# Patient Record
Sex: FEMALE | Race: WHITE | NOT HISPANIC OR LATINO | Employment: FULL TIME | URBAN - METROPOLITAN AREA
[De-identification: names, ages, dates, MRNs, and addresses within clinical notes are randomized per-mention and may not be internally consistent; named-entity substitution may affect disease eponyms.]

---

## 2018-02-19 LAB
PAP RECOMMENDATION EXT: NORMAL
PAP SMEAR: NORMAL

## 2019-07-19 LAB — HCV AB SERPL QL IA: NEGATIVE

## 2021-03-11 ENCOUNTER — INITIAL PRENATAL (OUTPATIENT)
Dept: OBSTETRICS AND GYNECOLOGY | Facility: CLINIC | Age: 31
End: 2021-03-11
Payer: OTHER GOVERNMENT

## 2021-03-11 DIAGNOSIS — Z32.01 PREGNANCY CONFIRMED BY POSITIVE URINE TEST: Primary | ICD-10-CM

## 2021-03-11 PROCEDURE — 0502F PR SUBSEQUENT PRENATAL CARE: ICD-10-PCS | Mod: S$GLB,,, | Performed by: OBSTETRICS & GYNECOLOGY

## 2021-03-11 PROCEDURE — 0502F SUBSEQUENT PRENATAL CARE: CPT | Mod: S$GLB,,, | Performed by: OBSTETRICS & GYNECOLOGY

## 2021-03-12 ENCOUNTER — PROCEDURE VISIT (OUTPATIENT)
Dept: OBSTETRICS AND GYNECOLOGY | Facility: CLINIC | Age: 31
End: 2021-03-12
Payer: COMMERCIAL

## 2021-03-12 ENCOUNTER — PROCEDURE VISIT (OUTPATIENT)
Dept: OBSTETRICS AND GYNECOLOGY | Facility: CLINIC | Age: 31
End: 2021-03-12
Attending: OBSTETRICS & GYNECOLOGY
Payer: COMMERCIAL

## 2021-03-12 DIAGNOSIS — Z32.01 PREGNANCY CONFIRMED BY POSITIVE URINE TEST: ICD-10-CM

## 2021-03-12 PROCEDURE — 76801 PR US, OB <14WKS, TRANSABD, SINGLE GESTATION: ICD-10-PCS | Mod: S$GLB,,, | Performed by: OBSTETRICS & GYNECOLOGY

## 2021-03-12 PROCEDURE — 76801 OB US < 14 WKS SINGLE FETUS: CPT | Mod: S$GLB,,, | Performed by: OBSTETRICS & GYNECOLOGY

## 2021-03-18 ENCOUNTER — TELEPHONE (OUTPATIENT)
Dept: OBSTETRICS AND GYNECOLOGY | Facility: CLINIC | Age: 31
End: 2021-03-18

## 2021-03-18 ENCOUNTER — PROCEDURE VISIT (OUTPATIENT)
Dept: OBSTETRICS AND GYNECOLOGY | Facility: CLINIC | Age: 31
End: 2021-03-18
Payer: COMMERCIAL

## 2021-03-18 DIAGNOSIS — O46.90: ICD-10-CM

## 2021-03-18 DIAGNOSIS — O47.9: ICD-10-CM

## 2021-03-18 PROCEDURE — 76801 OB US < 14 WKS SINGLE FETUS: CPT | Mod: S$GLB,,, | Performed by: OBSTETRICS & GYNECOLOGY

## 2021-03-18 PROCEDURE — 76801 PR US, OB <14WKS, TRANSABD, SINGLE GESTATION: ICD-10-PCS | Mod: S$GLB,,, | Performed by: OBSTETRICS & GYNECOLOGY

## 2021-04-27 ENCOUNTER — ROUTINE PRENATAL (OUTPATIENT)
Dept: OBSTETRICS AND GYNECOLOGY | Facility: CLINIC | Age: 31
End: 2021-04-27
Payer: OTHER GOVERNMENT

## 2021-04-27 ENCOUNTER — LAB VISIT (OUTPATIENT)
Dept: LAB | Facility: CLINIC | Age: 31
End: 2021-04-27
Payer: OTHER GOVERNMENT

## 2021-04-27 VITALS — WEIGHT: 224 LBS | DIASTOLIC BLOOD PRESSURE: 62 MMHG | SYSTOLIC BLOOD PRESSURE: 117 MMHG

## 2021-04-27 DIAGNOSIS — Z3A.15 15 WEEKS GESTATION OF PREGNANCY: Primary | ICD-10-CM

## 2021-04-27 DIAGNOSIS — Z3A.15 15 WEEKS GESTATION OF PREGNANCY: ICD-10-CM

## 2021-04-27 LAB
ABO + RH BLD: NORMAL
ABO + RH BLD: NORMAL
AMPHET+METHAMPHET UR QL: NEGATIVE
BARBITURATES UR QL SCN>200 NG/ML: NEGATIVE
BASOPHILS # BLD AUTO: 0.02 K/UL (ref 0–0.2)
BASOPHILS NFR BLD: 0.3 % (ref 0–1.9)
BENZODIAZ UR QL SCN>200 NG/ML: NEGATIVE
BLD GP AB SCN CELLS X3 SERPL QL: NORMAL
BZE UR QL SCN: NEGATIVE
CANNABINOIDS UR QL SCN: NEGATIVE
CREAT UR-MCNC: 121.6 MG/DL (ref 15–325)
DIFFERENTIAL METHOD: ABNORMAL
EOSINOPHIL # BLD AUTO: 0.1 K/UL (ref 0–0.5)
EOSINOPHIL NFR BLD: 0.7 % (ref 0–8)
ERYTHROCYTE [DISTWIDTH] IN BLOOD BY AUTOMATED COUNT: 12.4 % (ref 11.5–14.5)
HCT VFR BLD AUTO: 37.1 % (ref 37–48.5)
HGB BLD-MCNC: 12.3 G/DL (ref 12–16)
IMM GRANULOCYTES # BLD AUTO: 0.01 K/UL (ref 0–0.04)
IMM GRANULOCYTES NFR BLD AUTO: 0.1 % (ref 0–0.5)
LYMPHOCYTES # BLD AUTO: 1.2 K/UL (ref 1–4.8)
LYMPHOCYTES NFR BLD: 17.4 % (ref 18–48)
MCH RBC QN AUTO: 28.9 PG (ref 27–31)
MCHC RBC AUTO-ENTMCNC: 33.2 G/DL (ref 32–36)
MCV RBC AUTO: 87 FL (ref 82–98)
MONOCYTES # BLD AUTO: 0.5 K/UL (ref 0.3–1)
MONOCYTES NFR BLD: 6.9 % (ref 4–15)
NEUTROPHILS # BLD AUTO: 5.3 K/UL (ref 1.8–7.7)
NEUTROPHILS NFR BLD: 74.6 % (ref 38–73)
NRBC BLD-RTO: 0 /100 WBC
OPIATES UR QL SCN: NEGATIVE
PCP UR QL SCN>25 NG/ML: NEGATIVE
PLATELET # BLD AUTO: 148 K/UL (ref 150–450)
PMV BLD AUTO: 12.3 FL (ref 9.2–12.9)
RBC # BLD AUTO: 4.26 M/UL (ref 4–5.4)
TOXICOLOGY INFORMATION: NORMAL
WBC # BLD AUTO: 7.08 K/UL (ref 3.9–12.7)

## 2021-04-27 PROCEDURE — 80307 DRUG TEST PRSMV CHEM ANLYZR: CPT | Performed by: OBSTETRICS & GYNECOLOGY

## 2021-04-27 PROCEDURE — 86762 RUBELLA ANTIBODY: CPT | Performed by: OBSTETRICS & GYNECOLOGY

## 2021-04-27 PROCEDURE — 86592 SYPHILIS TEST NON-TREP QUAL: CPT | Performed by: OBSTETRICS & GYNECOLOGY

## 2021-04-27 PROCEDURE — 86850 RBC ANTIBODY SCREEN: CPT | Performed by: OBSTETRICS & GYNECOLOGY

## 2021-04-27 PROCEDURE — 86703 HIV-1/HIV-2 1 RESULT ANTBDY: CPT | Performed by: OBSTETRICS & GYNECOLOGY

## 2021-04-27 PROCEDURE — 87340 HEPATITIS B SURFACE AG IA: CPT | Performed by: OBSTETRICS & GYNECOLOGY

## 2021-04-27 PROCEDURE — 87591 N.GONORRHOEAE DNA AMP PROB: CPT | Performed by: OBSTETRICS & GYNECOLOGY

## 2021-04-27 PROCEDURE — 85025 COMPLETE CBC W/AUTO DIFF WBC: CPT | Performed by: OBSTETRICS & GYNECOLOGY

## 2021-04-27 PROCEDURE — 87086 URINE CULTURE/COLONY COUNT: CPT | Performed by: OBSTETRICS & GYNECOLOGY

## 2021-04-27 PROCEDURE — 0502F SUBSEQUENT PRENATAL CARE: CPT | Mod: S$GLB,,, | Performed by: OBSTETRICS & GYNECOLOGY

## 2021-04-27 PROCEDURE — 87491 CHLMYD TRACH DNA AMP PROBE: CPT | Performed by: OBSTETRICS & GYNECOLOGY

## 2021-04-27 PROCEDURE — 0502F PR SUBSEQUENT PRENATAL CARE: ICD-10-PCS | Mod: S$GLB,,, | Performed by: OBSTETRICS & GYNECOLOGY

## 2021-04-28 LAB
C TRACH DNA SPEC QL NAA+PROBE: NOT DETECTED
HBV SURFACE AG SERPL QL IA: NEGATIVE
HIV 1+2 AB+HIV1 P24 AG SERPL QL IA: NEGATIVE
N GONORRHOEA DNA SPEC QL NAA+PROBE: NOT DETECTED
RPR SER QL: NORMAL
RUBV IGG SER-ACNC: 24.4 IU/ML
RUBV IGG SER-IMP: REACTIVE

## 2021-04-29 LAB — BACTERIA UR CULT: NO GROWTH

## 2021-05-19 ENCOUNTER — LAB VISIT (OUTPATIENT)
Dept: LAB | Facility: CLINIC | Age: 31
End: 2021-05-19
Payer: COMMERCIAL

## 2021-05-19 ENCOUNTER — PROCEDURE VISIT (OUTPATIENT)
Dept: OBSTETRICS AND GYNECOLOGY | Facility: CLINIC | Age: 31
End: 2021-05-19
Payer: OTHER GOVERNMENT

## 2021-05-19 VITALS — SYSTOLIC BLOOD PRESSURE: 118 MMHG | DIASTOLIC BLOOD PRESSURE: 74 MMHG | WEIGHT: 210 LBS

## 2021-05-19 DIAGNOSIS — Z3A.15 15 WEEKS GESTATION OF PREGNANCY: ICD-10-CM

## 2021-05-19 DIAGNOSIS — Z86.69 HX OF MIGRAINE HEADACHES: ICD-10-CM

## 2021-05-19 DIAGNOSIS — Z34.92 NORMAL PREGNANCY IN SECOND TRIMESTER: Primary | ICD-10-CM

## 2021-05-19 DIAGNOSIS — Z3A.18 18 WEEKS GESTATION OF PREGNANCY: ICD-10-CM

## 2021-05-19 DIAGNOSIS — Z36.0 ENCOUNTER FOR ANTENATAL SCREENING FOR CHROMOSOMAL ANOMALIES: ICD-10-CM

## 2021-05-19 DIAGNOSIS — Z34.92 NORMAL PREGNANCY IN SECOND TRIMESTER: ICD-10-CM

## 2021-05-19 PROCEDURE — 36415 PR COLLECTION VENOUS BLOOD,VENIPUNCTURE: ICD-10-PCS | Mod: ,,, | Performed by: OBSTETRICS & GYNECOLOGY

## 2021-05-19 PROCEDURE — 0502F SUBSEQUENT PRENATAL CARE: CPT | Mod: S$GLB,,, | Performed by: OBSTETRICS & GYNECOLOGY

## 2021-05-19 PROCEDURE — 36415 COLL VENOUS BLD VENIPUNCTURE: CPT | Mod: ,,, | Performed by: OBSTETRICS & GYNECOLOGY

## 2021-05-19 PROCEDURE — 76805 OB US >/= 14 WKS SNGL FETUS: CPT | Mod: S$GLB,,, | Performed by: OBSTETRICS & GYNECOLOGY

## 2021-05-19 PROCEDURE — 82105 ALPHA-FETOPROTEIN SERUM: CPT | Performed by: OBSTETRICS & GYNECOLOGY

## 2021-05-19 PROCEDURE — 0502F PR SUBSEQUENT PRENATAL CARE: ICD-10-PCS | Mod: S$GLB,,, | Performed by: OBSTETRICS & GYNECOLOGY

## 2021-05-19 PROCEDURE — 76805 PR US, OB 14+WKS, TRANSABD, SINGLE GESTATION: ICD-10-PCS | Mod: S$GLB,,, | Performed by: OBSTETRICS & GYNECOLOGY

## 2021-05-19 RX ORDER — SUMATRIPTAN SUCCINATE 100 MG/1
100 TABLET ORAL ONCE AS NEEDED
Qty: 30 TABLET | Refills: 2 | Status: SHIPPED | OUTPATIENT
Start: 2021-05-19 | End: 2021-05-19

## 2021-05-21 ENCOUNTER — TELEPHONE (OUTPATIENT)
Dept: OBSTETRICS AND GYNECOLOGY | Facility: CLINIC | Age: 31
End: 2021-05-21

## 2021-05-21 DIAGNOSIS — Z86.69 HISTORY OF MIGRAINE HEADACHES: Primary | ICD-10-CM

## 2021-05-21 RX ORDER — SUMATRIPTAN SUCCINATE 100 MG/1
100 TABLET ORAL ONCE AS NEEDED
Qty: 30 TABLET | Refills: 2 | Status: SHIPPED | OUTPATIENT
Start: 2021-05-21 | End: 2021-08-24

## 2021-05-24 ENCOUNTER — TELEPHONE (OUTPATIENT)
Dept: OBSTETRICS AND GYNECOLOGY | Facility: CLINIC | Age: 31
End: 2021-05-24

## 2021-05-24 LAB
# FETUSES US: NORMAL
AFP INTERPRETATION: NORMAL
AFP MOM SERPL: 1.17
AFP SERPL-MCNC: 53.3 NG/ML
AFP SERPL-MCNC: NEGATIVE NG/ML
AGE AT DELIVERY: 31
GA (DAYS): 5 D
GA (WEEKS): 18 WK
GESTATIONAL AGE METHOD: NORMAL
IDDM PATIENT QL: NORMAL
SMOKING STATUS FTND: NO

## 2021-06-16 ENCOUNTER — ROUTINE PRENATAL (OUTPATIENT)
Dept: OBSTETRICS AND GYNECOLOGY | Facility: CLINIC | Age: 31
End: 2021-06-16
Payer: OTHER GOVERNMENT

## 2021-06-16 VITALS — WEIGHT: 218 LBS | DIASTOLIC BLOOD PRESSURE: 62 MMHG | SYSTOLIC BLOOD PRESSURE: 100 MMHG

## 2021-06-16 DIAGNOSIS — Z34.92 NORMAL PREGNANCY IN SECOND TRIMESTER: Primary | ICD-10-CM

## 2021-06-16 PROCEDURE — 0502F SUBSEQUENT PRENATAL CARE: CPT | Mod: S$GLB,,, | Performed by: OBSTETRICS & GYNECOLOGY

## 2021-06-16 PROCEDURE — 0502F PR SUBSEQUENT PRENATAL CARE: ICD-10-PCS | Mod: S$GLB,,, | Performed by: OBSTETRICS & GYNECOLOGY

## 2021-07-02 ENCOUNTER — PATIENT MESSAGE (OUTPATIENT)
Dept: ADMINISTRATIVE | Facility: OTHER | Age: 31
End: 2021-07-02

## 2021-07-12 DIAGNOSIS — Z34.92 NORMAL PREGNANCY IN SECOND TRIMESTER: Primary | ICD-10-CM

## 2021-07-15 ENCOUNTER — LAB VISIT (OUTPATIENT)
Dept: LAB | Facility: CLINIC | Age: 31
End: 2021-07-15
Payer: OTHER GOVERNMENT

## 2021-07-15 ENCOUNTER — PROCEDURE VISIT (OUTPATIENT)
Dept: OBSTETRICS AND GYNECOLOGY | Facility: CLINIC | Age: 31
End: 2021-07-15
Payer: OTHER GOVERNMENT

## 2021-07-15 VITALS — DIASTOLIC BLOOD PRESSURE: 72 MMHG | SYSTOLIC BLOOD PRESSURE: 116 MMHG | WEIGHT: 226 LBS

## 2021-07-15 DIAGNOSIS — Z34.92 NORMAL PREGNANCY IN SECOND TRIMESTER: ICD-10-CM

## 2021-07-15 DIAGNOSIS — Z3A.27 PREGNANCY WITH 27 COMPLETED WEEKS GESTATION: Primary | ICD-10-CM

## 2021-07-15 LAB
BASOPHILS # BLD AUTO: 0.01 K/UL (ref 0–0.2)
BASOPHILS NFR BLD: 0.1 % (ref 0–1.9)
DIFFERENTIAL METHOD: ABNORMAL
EOSINOPHIL # BLD AUTO: 0.1 K/UL (ref 0–0.5)
EOSINOPHIL NFR BLD: 0.6 % (ref 0–8)
ERYTHROCYTE [DISTWIDTH] IN BLOOD BY AUTOMATED COUNT: 12 % (ref 11.5–14.5)
GLUCOSE SERPL-MCNC: 116 MG/DL (ref 70–140)
HCT VFR BLD AUTO: 36.6 % (ref 37–48.5)
HGB BLD-MCNC: 11.9 G/DL (ref 12–16)
IMM GRANULOCYTES # BLD AUTO: 0.03 K/UL (ref 0–0.04)
IMM GRANULOCYTES NFR BLD AUTO: 0.4 % (ref 0–0.5)
LYMPHOCYTES # BLD AUTO: 1.1 K/UL (ref 1–4.8)
LYMPHOCYTES NFR BLD: 12.5 % (ref 18–48)
MCH RBC QN AUTO: 28.3 PG (ref 27–31)
MCHC RBC AUTO-ENTMCNC: 32.5 G/DL (ref 32–36)
MCV RBC AUTO: 87 FL (ref 82–98)
MONOCYTES # BLD AUTO: 0.5 K/UL (ref 0.3–1)
MONOCYTES NFR BLD: 5.6 % (ref 4–15)
NEUTROPHILS # BLD AUTO: 6.9 K/UL (ref 1.8–7.7)
NEUTROPHILS NFR BLD: 80.8 % (ref 38–73)
NRBC BLD-RTO: 0 /100 WBC
PLATELET # BLD AUTO: 113 K/UL (ref 150–450)
PMV BLD AUTO: 12.7 FL (ref 9.2–12.9)
RBC # BLD AUTO: 4.21 M/UL (ref 4–5.4)
WBC # BLD AUTO: 8.55 K/UL (ref 3.9–12.7)

## 2021-07-15 PROCEDURE — 76815 PR  US,PREGNANT UTERUS,LIMITED, 1/> FETUSES: ICD-10-PCS | Mod: S$GLB,,, | Performed by: OBSTETRICS & GYNECOLOGY

## 2021-07-15 PROCEDURE — 76815 OB US LIMITED FETUS(S): CPT | Mod: S$GLB,,, | Performed by: OBSTETRICS & GYNECOLOGY

## 2021-07-15 PROCEDURE — 82950 GLUCOSE TEST: CPT | Performed by: NURSE PRACTITIONER

## 2021-07-15 PROCEDURE — 36415 COLL VENOUS BLD VENIPUNCTURE: CPT | Mod: ,,, | Performed by: NURSE PRACTITIONER

## 2021-07-15 PROCEDURE — 59425 PR ANTEPARTUM CARE ONLY, 4-6 VISITS: ICD-10-PCS | Mod: S$GLB,,, | Performed by: OBSTETRICS & GYNECOLOGY

## 2021-07-15 PROCEDURE — 36415 PR COLLECTION VENOUS BLOOD,VENIPUNCTURE: ICD-10-PCS | Mod: ,,, | Performed by: NURSE PRACTITIONER

## 2021-07-15 PROCEDURE — 85025 COMPLETE CBC W/AUTO DIFF WBC: CPT | Performed by: NURSE PRACTITIONER

## 2021-07-15 PROCEDURE — 59425 ANTEPARTUM CARE ONLY: CPT | Mod: S$GLB,,, | Performed by: OBSTETRICS & GYNECOLOGY

## 2021-07-23 ENCOUNTER — PATIENT MESSAGE (OUTPATIENT)
Dept: ADMINISTRATIVE | Facility: OTHER | Age: 31
End: 2021-07-23

## 2021-08-11 ENCOUNTER — TELEPHONE (OUTPATIENT)
Dept: OBSTETRICS AND GYNECOLOGY | Facility: CLINIC | Age: 31
End: 2021-08-11

## 2021-08-12 ENCOUNTER — ROUTINE PRENATAL (OUTPATIENT)
Dept: OBSTETRICS AND GYNECOLOGY | Facility: CLINIC | Age: 31
End: 2021-08-12
Payer: OTHER GOVERNMENT

## 2021-08-12 VITALS — WEIGHT: 225 LBS | DIASTOLIC BLOOD PRESSURE: 70 MMHG | SYSTOLIC BLOOD PRESSURE: 120 MMHG

## 2021-08-12 DIAGNOSIS — Z3A.31 PREGNANCY WITH 31 COMPLETED WEEKS GESTATION: Primary | ICD-10-CM

## 2021-08-12 PROCEDURE — 0502F PR SUBSEQUENT PRENATAL CARE: ICD-10-PCS | Mod: S$GLB,,, | Performed by: OBSTETRICS & GYNECOLOGY

## 2021-08-12 PROCEDURE — 0502F SUBSEQUENT PRENATAL CARE: CPT | Mod: S$GLB,,, | Performed by: OBSTETRICS & GYNECOLOGY

## 2021-08-27 ENCOUNTER — TELEPHONE (OUTPATIENT)
Dept: OBSTETRICS AND GYNECOLOGY | Facility: CLINIC | Age: 31
End: 2021-08-27

## 2021-08-27 DIAGNOSIS — Z34.93 NORMAL PREGNANCY IN THIRD TRIMESTER: Primary | ICD-10-CM

## 2021-09-09 ENCOUNTER — PROCEDURE VISIT (OUTPATIENT)
Dept: OBSTETRICS AND GYNECOLOGY | Facility: CLINIC | Age: 31
End: 2021-09-09
Payer: COMMERCIAL

## 2021-09-09 ENCOUNTER — ROUTINE PRENATAL (OUTPATIENT)
Dept: OBSTETRICS AND GYNECOLOGY | Facility: CLINIC | Age: 31
End: 2021-09-09
Payer: COMMERCIAL

## 2021-09-09 VITALS
DIASTOLIC BLOOD PRESSURE: 68 MMHG | HEIGHT: 68 IN | BODY MASS INDEX: 34.86 KG/M2 | SYSTOLIC BLOOD PRESSURE: 118 MMHG | WEIGHT: 230 LBS

## 2021-09-09 DIAGNOSIS — Z3A.34 34 WEEKS GESTATION OF PREGNANCY: Primary | ICD-10-CM

## 2021-09-09 DIAGNOSIS — Z3A.35 PREGNANCY WITH 35 COMPLETED WEEKS GESTATION: ICD-10-CM

## 2021-09-09 DIAGNOSIS — Z34.93 NORMAL PREGNANCY IN THIRD TRIMESTER: ICD-10-CM

## 2021-09-09 PROCEDURE — 0502F SUBSEQUENT PRENATAL CARE: CPT | Mod: S$GLB,,, | Performed by: NURSE PRACTITIONER

## 2021-09-09 PROCEDURE — 87081 CULTURE SCREEN ONLY: CPT | Performed by: NURSE PRACTITIONER

## 2021-09-09 PROCEDURE — 76816 PR  US,PREGNANT UTERUS,F/U,TRANSABD APP: ICD-10-PCS | Mod: S$GLB,,, | Performed by: OBSTETRICS & GYNECOLOGY

## 2021-09-09 PROCEDURE — 76816 OB US FOLLOW-UP PER FETUS: CPT | Mod: S$GLB,,, | Performed by: OBSTETRICS & GYNECOLOGY

## 2021-09-09 PROCEDURE — 0502F PR SUBSEQUENT PRENATAL CARE: ICD-10-PCS | Mod: S$GLB,,, | Performed by: NURSE PRACTITIONER

## 2021-09-13 LAB — BACTERIA SPEC AEROBE CULT: NORMAL

## 2021-09-16 ENCOUNTER — ROUTINE PRENATAL (OUTPATIENT)
Dept: OBSTETRICS AND GYNECOLOGY | Facility: CLINIC | Age: 31
End: 2021-09-16
Payer: OTHER GOVERNMENT

## 2021-09-16 VITALS — SYSTOLIC BLOOD PRESSURE: 110 MMHG | DIASTOLIC BLOOD PRESSURE: 60 MMHG | WEIGHT: 230 LBS | BODY MASS INDEX: 34.97 KG/M2

## 2021-09-16 DIAGNOSIS — Z34.93 NORMAL PREGNANCY IN THIRD TRIMESTER: Primary | ICD-10-CM

## 2021-09-16 DIAGNOSIS — Z3A.36 36 WEEKS GESTATION OF PREGNANCY: ICD-10-CM

## 2021-09-16 PROCEDURE — 0502F PR SUBSEQUENT PRENATAL CARE: ICD-10-PCS | Mod: S$GLB,,, | Performed by: NURSE PRACTITIONER

## 2021-09-16 PROCEDURE — 0502F SUBSEQUENT PRENATAL CARE: CPT | Mod: S$GLB,,, | Performed by: NURSE PRACTITIONER

## 2021-09-22 ENCOUNTER — ROUTINE PRENATAL (OUTPATIENT)
Dept: OBSTETRICS AND GYNECOLOGY | Facility: CLINIC | Age: 31
End: 2021-09-22
Payer: OTHER GOVERNMENT

## 2021-09-22 VITALS — WEIGHT: 233 LBS | DIASTOLIC BLOOD PRESSURE: 70 MMHG | BODY MASS INDEX: 35.43 KG/M2 | SYSTOLIC BLOOD PRESSURE: 114 MMHG

## 2021-09-22 DIAGNOSIS — Z34.93 NORMAL PREGNANCY IN THIRD TRIMESTER: Primary | ICD-10-CM

## 2021-09-22 DIAGNOSIS — Z3A.37 37 WEEKS GESTATION OF PREGNANCY: ICD-10-CM

## 2021-09-22 PROCEDURE — 0502F PR SUBSEQUENT PRENATAL CARE: ICD-10-PCS | Mod: S$GLB,,, | Performed by: NURSE PRACTITIONER

## 2021-09-22 PROCEDURE — 0502F SUBSEQUENT PRENATAL CARE: CPT | Mod: S$GLB,,, | Performed by: NURSE PRACTITIONER

## 2021-09-29 ENCOUNTER — ROUTINE PRENATAL (OUTPATIENT)
Dept: OBSTETRICS AND GYNECOLOGY | Facility: CLINIC | Age: 31
End: 2021-09-29
Payer: OTHER GOVERNMENT

## 2021-09-29 VITALS — WEIGHT: 239 LBS | DIASTOLIC BLOOD PRESSURE: 74 MMHG | BODY MASS INDEX: 36.34 KG/M2 | SYSTOLIC BLOOD PRESSURE: 122 MMHG

## 2021-09-29 DIAGNOSIS — Z3A.38 38 WEEKS GESTATION OF PREGNANCY: ICD-10-CM

## 2021-09-29 DIAGNOSIS — Z34.93 NORMAL PREGNANCY IN THIRD TRIMESTER: Primary | ICD-10-CM

## 2021-09-29 PROCEDURE — 0502F SUBSEQUENT PRENATAL CARE: CPT | Mod: S$GLB,,, | Performed by: NURSE PRACTITIONER

## 2021-09-29 PROCEDURE — 0502F PR SUBSEQUENT PRENATAL CARE: ICD-10-PCS | Mod: S$GLB,,, | Performed by: NURSE PRACTITIONER

## 2021-09-30 ENCOUNTER — PATIENT MESSAGE (OUTPATIENT)
Dept: OBSTETRICS AND GYNECOLOGY | Facility: CLINIC | Age: 31
End: 2021-09-30

## 2021-10-05 ENCOUNTER — OUTSIDE PLACE OF SERVICE (OUTPATIENT)
Dept: OBSTETRICS AND GYNECOLOGY | Facility: CLINIC | Age: 31
End: 2021-10-05
Payer: COMMERCIAL

## 2021-10-05 PROCEDURE — 59400 OBSTETRICAL CARE: CPT | Mod: ,,, | Performed by: OBSTETRICS & GYNECOLOGY

## 2021-10-05 PROCEDURE — 59400 PR FULL ROUT OBSTE CARE,VAGINAL DELIV: ICD-10-PCS | Mod: ,,, | Performed by: OBSTETRICS & GYNECOLOGY

## 2021-10-21 ENCOUNTER — PATIENT MESSAGE (OUTPATIENT)
Dept: OBSTETRICS AND GYNECOLOGY | Facility: CLINIC | Age: 31
End: 2021-10-21
Payer: COMMERCIAL

## 2021-10-21 ENCOUNTER — TELEPHONE (OUTPATIENT)
Dept: OBSTETRICS AND GYNECOLOGY | Facility: CLINIC | Age: 31
End: 2021-10-21

## 2021-11-19 ENCOUNTER — TELEPHONE (OUTPATIENT)
Dept: OBSTETRICS AND GYNECOLOGY | Facility: CLINIC | Age: 31
End: 2021-11-19
Payer: OTHER GOVERNMENT

## 2021-11-19 VITALS
SYSTOLIC BLOOD PRESSURE: 152 MMHG | BODY MASS INDEX: 31.96 KG/M2 | WEIGHT: 215.81 LBS | HEIGHT: 69 IN | DIASTOLIC BLOOD PRESSURE: 92 MMHG

## 2021-11-19 DIAGNOSIS — Z30.430 ENCOUNTER FOR IUD INSERTION: Primary | ICD-10-CM

## 2021-11-19 DIAGNOSIS — R60.9 FLUID RETENTION: ICD-10-CM

## 2021-11-19 DIAGNOSIS — Z30.09 BIRTH CONTROL COUNSELING: ICD-10-CM

## 2021-11-19 PROCEDURE — 0503F POSTPARTUM CARE VISIT: CPT | Mod: S$GLB,,, | Performed by: OBSTETRICS & GYNECOLOGY

## 2021-11-19 PROCEDURE — 0503F PR POSTPARTUM CARE VISIT: ICD-10-PCS | Mod: S$GLB,,, | Performed by: OBSTETRICS & GYNECOLOGY

## 2021-11-19 RX ORDER — TRIAMTERENE AND HYDROCHLOROTHIAZIDE 37.5; 25 MG/1; MG/1
1 CAPSULE ORAL DAILY
Qty: 30 CAPSULE | Refills: 11 | Status: SHIPPED | OUTPATIENT
Start: 2021-11-19 | End: 2022-02-25 | Stop reason: SDUPTHER

## 2021-12-16 ENCOUNTER — PROCEDURE VISIT (OUTPATIENT)
Dept: OBSTETRICS AND GYNECOLOGY | Facility: CLINIC | Age: 31
End: 2021-12-16
Payer: OTHER GOVERNMENT

## 2021-12-16 VITALS
HEIGHT: 69 IN | DIASTOLIC BLOOD PRESSURE: 74 MMHG | BODY MASS INDEX: 31.55 KG/M2 | WEIGHT: 213 LBS | SYSTOLIC BLOOD PRESSURE: 128 MMHG

## 2021-12-16 DIAGNOSIS — Z97.5 CONTRACEPTIVE DEVICE, INTRAUTERINE: Primary | ICD-10-CM

## 2021-12-16 DIAGNOSIS — Z30.430 ENCOUNTER FOR INSERTION OF PARAGARD IUD: ICD-10-CM

## 2021-12-16 LAB
B-HCG UR QL: NEGATIVE
CTP QC/QA: YES

## 2021-12-16 PROCEDURE — 58300 PR INSERT INTRAUTERINE DEVICE: ICD-10-PCS | Mod: S$GLB,,, | Performed by: NURSE PRACTITIONER

## 2021-12-16 PROCEDURE — 81025 POCT URINE PREGNANCY: ICD-10-PCS | Mod: S$GLB,,, | Performed by: NURSE PRACTITIONER

## 2021-12-16 PROCEDURE — 81025 URINE PREGNANCY TEST: CPT | Mod: S$GLB,,, | Performed by: NURSE PRACTITIONER

## 2021-12-16 PROCEDURE — 58300 INSERT INTRAUTERINE DEVICE: CPT | Mod: S$GLB,,, | Performed by: NURSE PRACTITIONER

## 2022-01-19 ENCOUNTER — OFFICE VISIT (OUTPATIENT)
Dept: OBSTETRICS AND GYNECOLOGY | Facility: CLINIC | Age: 32
End: 2022-01-19
Payer: OTHER GOVERNMENT

## 2022-01-19 VITALS
SYSTOLIC BLOOD PRESSURE: 140 MMHG | WEIGHT: 211.81 LBS | DIASTOLIC BLOOD PRESSURE: 80 MMHG | HEIGHT: 69 IN | BODY MASS INDEX: 31.37 KG/M2

## 2022-01-19 DIAGNOSIS — Z30.431 IUD CHECK UP: Primary | ICD-10-CM

## 2022-01-19 DIAGNOSIS — G89.29 CHRONIC BILATERAL LOW BACK PAIN WITH RIGHT-SIDED SCIATICA: ICD-10-CM

## 2022-01-19 DIAGNOSIS — M54.41 CHRONIC BILATERAL LOW BACK PAIN WITH RIGHT-SIDED SCIATICA: ICD-10-CM

## 2022-01-19 PROBLEM — M25.559 ARTHRALGIA OF HIP: Status: ACTIVE | Noted: 2022-01-19

## 2022-01-19 PROCEDURE — 99212 OFFICE O/P EST SF 10 MIN: CPT | Mod: S$GLB,,, | Performed by: NURSE PRACTITIONER

## 2022-01-19 PROCEDURE — 99212 PR OFFICE/OUTPT VISIT, EST, LEVL II, 10-19 MIN: ICD-10-PCS | Mod: S$GLB,,, | Performed by: NURSE PRACTITIONER

## 2022-01-19 RX ORDER — IBUPROFEN 800 MG/1
800 TABLET ORAL EVERY 8 HOURS PRN
Qty: 60 TABLET | Refills: 2 | Status: SHIPPED | OUTPATIENT
Start: 2022-01-19 | End: 2022-02-25 | Stop reason: SDUPTHER

## 2022-01-19 NOTE — PROGRESS NOTES
"CC: IUD check     Lisa Buenrostro is a 31 y.o. female  presents for IUD check.  She denies any issues, problems or complaints.     BP (!) 140/80   Ht 5' 9" (1.753 m)   Wt 96.1 kg (211 lb 12.8 oz)   LMP 2021   Breastfeeding No   BMI 31.28 kg/m²       ROS:  GENERAL: No fever, chills, or fatigue   VULVAR: No pain, no lesions and no itching.  VAGINAL: No itching, no discharge, no abnormal bleeding and no lesions.  ABDOMEN: No abdominal pain. Denies nausea. Denies vomiting. No diarrhea. No constipation  URINARY: No incontinence, no nocturia, no frequency and no dysuria.  CARDIOVASCULAR: No chest pain. No shortness of breath.   NEUROLOGICAL: No headaches. No vision changes.    Physical Exam:      PELVIC: Normal external genitalia without lesions.  Vagina without lesions or discharge.  Cervix without lesions and IUD strings are visible at os.      Patient is still having low back pain that she was having during pregnancy and post-postpartum.  Ibuprofen refilled and will send referral to Dr. Broussard to evaluate back pain.      1. IUD check up      F/U in 1 year or as needed    "

## 2022-01-24 ENCOUNTER — TELEPHONE (OUTPATIENT)
Dept: OBSTETRICS AND GYNECOLOGY | Facility: CLINIC | Age: 32
End: 2022-01-24
Payer: OTHER GOVERNMENT

## 2022-01-24 NOTE — TELEPHONE ENCOUNTER
----- Message from Gavi Washburn sent at 1/24/2022 10:29 AM CST -----  Contact: pt  Type: Needs Medical Advice         Who Called: pt  Best Call Back Number:[phone]    Additional Information: Requesting a call back regarding  pt tested pos on 01/08 for covid and working from home. Pt needs letter from  that stating she can return to the office setting for work. Pt would like office to call her .  Pt would like to know if the letter can be put on her mychart.      Please Advise- Thank you

## 2022-01-25 ENCOUNTER — TELEPHONE (OUTPATIENT)
Dept: OBSTETRICS AND GYNECOLOGY | Facility: CLINIC | Age: 32
End: 2022-01-25
Payer: OTHER GOVERNMENT

## 2022-01-25 NOTE — TELEPHONE ENCOUNTER
----- Message from Angie Avel sent at 1/24/2022  4:48 PM CST -----  Contact: Patient  Type: Patient Call Back         Who called: Patient         What is the request in detail: calling regarding letter; states she returning call; states she Is looking mychart and still does not see letter; please advise         Can the clinic reply by [facility]? Call back         Would the patient rather a call back or a response via [facility]? Call back         Best call back number: [phone]         Additional Information:            Thank You

## 2022-02-25 ENCOUNTER — TELEPHONE (OUTPATIENT)
Dept: OBSTETRICS AND GYNECOLOGY | Facility: CLINIC | Age: 32
End: 2022-02-25
Payer: OTHER GOVERNMENT

## 2022-02-25 DIAGNOSIS — R60.9 FLUID RETENTION: ICD-10-CM

## 2022-02-25 RX ORDER — TRIAMTERENE AND HYDROCHLOROTHIAZIDE 37.5; 25 MG/1; MG/1
1 CAPSULE ORAL DAILY
Qty: 30 CAPSULE | Refills: 11 | Status: SHIPPED | OUTPATIENT
Start: 2022-02-25 | End: 2023-02-15 | Stop reason: SDUPTHER

## 2022-02-25 RX ORDER — IBUPROFEN 800 MG/1
800 TABLET ORAL EVERY 8 HOURS PRN
Qty: 60 TABLET | Refills: 2 | Status: SHIPPED | OUTPATIENT
Start: 2022-02-25 | End: 2023-02-25

## 2022-02-25 NOTE — TELEPHONE ENCOUNTER
----- Message from Anne Bailey LPN sent at 2/25/2022  1:56 PM CST -----  Contact: pt  Pt was seen on 1/19/22.      ----- Message -----  From: Cecilia Napier  Sent: 2/25/2022  12:31 PM CST  To: Nu Stein I Staff    Type:  RX Refill Request-- resend to new pharm per insurance    Who Called:  the patient  Refill or New Rx:  refill  RX Name and Strength:     1. triamterene-hydrochlorothiazide 37.5-25 mg (DYAZIDE) 37.5-25 mg per capsule 30 capsule   Sig - Route: Take 1 capsule by mouth once daily. - Oral    2. ibuprofen (ADVIL,MOTRIN) 800 MG tablet 60 tablet  Sig - Route: Take 1 tablet (800 mg total) by mouth every 8 (eight) hours as needed for Pain. - Oral          How is the patient currently taking it? (ex. 1XDay):  as order  Is this a 30 day or 90 day RX:  as order  Preferred Pharmacy with phone number:        [facility]/pharmacy #[id number] - [place], [place] - [address] [facility]  [address]  [place] [address]  Phone: [phone] Fax: [phone]      Local or Mail Order:  local  Ordering Provider:  Nu Head Call Back Number:  [phone]  Additional Information:

## 2022-06-14 ENCOUNTER — TELEPHONE (OUTPATIENT)
Dept: OBSTETRICS AND GYNECOLOGY | Facility: CLINIC | Age: 32
End: 2022-06-14
Payer: OTHER GOVERNMENT

## 2022-06-14 RX ORDER — PROGESTERONE 200 MG/1
200 CAPSULE ORAL NIGHTLY
Qty: 24 CAPSULE | Refills: 1 | Status: SHIPPED | OUTPATIENT
Start: 2022-06-14 | End: 2023-02-15

## 2022-06-14 NOTE — TELEPHONE ENCOUNTER
Steroid injections can cause menstrual abnormalities but should resolve within a month.  IF not resolving I can call in some progesterone to stop irregular bleeding and reset cycle.  It will be at the pharmacy if she wants to pick it up.

## 2022-06-14 NOTE — TELEPHONE ENCOUNTER
Pt stated her menses has been on since 05/29/2022.  She stated that the only difference is that she received a steroid injection to the hip. Pt is inquiring if this is cause for concern.

## 2023-02-14 ENCOUNTER — PATIENT OUTREACH (OUTPATIENT)
Dept: ADMINISTRATIVE | Facility: HOSPITAL | Age: 33
End: 2023-02-14
Payer: OTHER GOVERNMENT

## 2023-02-14 NOTE — LETTER
"                      FAX      AUTHORIZATION FOR RELEASE OF   CONFIDENTIAL INFORMATION        Health Information      We are seeing Lisa Buenrostro, date of birth 1990, in the clinic at [facility]. Primary Doctor No is the patient's PCP. Lisa Buenrostro has an outstanding lab/procedure at the time we reviewed their chart. In order to help keep their health information updated, Lisa Buenrostro has authorized us to request the following medical record(s):         ( X )  PAP SMEAR (WITHIN 3 YRS OR HPV WITHIN 5 YRS)                  Please fax records to [facility]  [phone]        MARLA WHITESIDE LPN  CLINICAL CARE COORDINATOR   [facility]  PHONE: [phone]  FAX: [phone]           A. Consent for Examination and Treatment: I hereby authorize the providers and employees of [facility] to provide medical treatment/services which includes, but is not limited to, performing and administering tests and diagnostic procedures that are deemed necessary, Including, but not limited to, imaging examinations, blood tests and other laboratory procedures as may be required by the hospital, clinic, or may be ordered by my physician(s) or persons working under the general and/or special instructions of my physician(s).                   1.      I understand and agree that this consent covers all authorized persons, including but not limited to physicians, residents, nurse practitioners, physicians' assistants, specialists, consultants, student nurses, and independently contracted physicians, who are called upon by the physician in charge, to carry out the diagnostic procedures and medical or surgical treatment.  2.      I hereby authorize [facility] to retain or dispose of any specimens or tissue, should there be such remaining from any test or procedure.  3.      I hereby authorize and give consent for [facility] providers and employees to take photographs, images or videotapes of " such diagnostic, surgical or treatment procedures of Patient as may be required by [facility] or as may be ordered by a physician.  I further acknowledge and agree that [facility] may use cameras or other devices for patient monitoring.  4.      I am aware that the practice of medicine is not an exact science, and I acknowledge that no guarantees have been made to me as to the outcome of any tests, procedures or treatment.                   B. Authorization for Release of Information:  I understand that my insurance company and/or their agents may need information necessary to make determinations about payment/reimbursement.  I hereby provide authorization to release to all insurance companies, their successors, assignees, other parties with whom they may have contracted, or others acting on their behalf, that are involved with payment for any hospital and/or clinic charges incurred by the patient, any information that they request and deem necessary for payment/reimbursement, and/or quality review.  I further authorize the release of my health information to physicians or other health care practitioners on staff who are involved in my health care now and in the future, and to other health care providers, entities, or institutions for the purpose of my continued care and treatment, including referrals.     C. Medicare Patient's Certification and Authorization to Release Information and Payment Request:  I certify that the information given by me in applying for payment under Title XVIII of the Social Security Act is correct.  I authorize any chowdary of medical or other information about me to release to the Social Security Administration, or its intermediaries or carriers, any information needed for this or a related Medicare claim.  I request that payment of authorized benefits be made on my behalf.     D. Assignment of Insurance Benefits:  I hereby authorize any and all insurance companies, health plans, defined benefit  plans, health insurers or any entity that is or may be responsible for payment of my medical expenses to pay all hospital and medical benefits now due, and to become due and payable to me under any hospital benefits, sick benefits, injury benefits or any other benefit for services rendered to me, including Major Medical Benefits, direct to [facility] and all independently contracted physicians.  I assign any and all rights that I may have against any and all insurance companies, health plans, defined benefit plans, health insurers or any entity that is or may be responsible for payment of my medical expenses, including, but not limited to any right to appeal a denial of a claim, any right to bring any action, lawsuit, administrative proceeding, or other cause of action on my behalf.  I specifically assign my right to pursue litigation against any and all insurance companies, health plans, defined benefit plans, health insurers or any entity that is or may be responsible for payment of medical expenses based upon a refusal to pay charges.              REGISTRATION  AUTHORIZATION                    E. Valuables:  It is understood and agreed that [facility] is not liable for the damage to or loss of any money, jewelry, documents, dentures, eye glasses, hearing aids, prosthetics, or other property of value.     F. Computer Equipment:  I understand and agree that should I choose to use computer equipment owned by [facility] or if I choose to access the Internet via [employer]'s network, I do so at my own risk.  [facility] is not responsible for any damage to my computer equipment or to any damages of any type that might arise from my loss of equipment or data.                   G. Acceptance of Financial Responsibility:  I agree that in consideration of the services and supplies that have been or will be furnished to the patient,  I am hereby obligated to pay all charges made for or on the account of the patient according to the  standard rates (in effect at the time the services and supplies are delivered) established by [facility], including its Patient Financial Assistance Policy to the extent it is applicable.  I understand that I am responsible for all charges, or portions thereof, not covered by insurance or other sources.  Patient refunds will be distributed only after balances at all [facility] facilities are paid.                   H. Communication Authorization:  I hereby authorize [facility] and its representatives, along with any billing service or  who may work on their behalf, to contact me on my cell phone and/or home phone using prerecorded messages, artificial voice messages, automatic telephone dialing devices or other computer assisted technology, or by electronic mail, text messaging, or by any other form of electronic communication.  This includes, but is not limited to appointment reminders, yearly physical exam reminders, preventive care reminders, patient campaigns, welcome calls, and calls about account balances on my account or any account on which I am listed as a guarantor.  I understand I have the right to opt out of these communications at any time.     I.  Relationship Between Facility and Physician:  I understand that some, but not all, providers furnishing services to the patient are not employees or agents of [facility].  The patient is under the care and supervision of his/her attending physician, and it is the responsibility of the facility and its nursing staff to carry out the instructions of such physicians.  It is the responsibility of the patient's physician/designee to obtain the patient's informed consent, when required, for medical or surgical treatment, special diagnostic or therapeutic procedures, or hospital services rendered for the patient under the special instructions of the physician/designee.     J. Notice of Private Practices:  I acknowledge I have received a copy of [facility]'s Notice  of Privacy Practices.     K. Facility Directory:  I have discussed with the organization my desire to be either included or excluded in the facility directory.  I understand that if my choice is to opt-out of being identified in the facility directory that the facility will not provide any information about me such as my condition (e.g. Fair, stable, etc.) or my location in the facility (e.g. Room number, department).     L. LINKS:  For [place] Residents:  [facility] is a LINKS ([place] Immunization Network for Kids Statewide) participating facility.  LINKS is a [facility]-sponsored confidential computer system that helps you and your doctor keep track of you and your child's immunization history.  I acknowledge that I am allowing [facility] to share this information with [facility].  For [place] Residents:  [facility] is a MIIX ([place] Immunization Information eXchange) participant.  MIIX is a [place] Department of Health-sponsored confidential computer system that helps you and your doctor keep track of you and your child's immunization history.  I acknowledge that I am allowing [facility] to share information with [employer].     M. Term:  This authorization is valid for this and subsequent care/treatment I receive at [facility] and will remain valid unless/until revoked in writing by me.              REGISTRATION  AUTHORIZATION                 N. [facility]:  As used in this document, [facility] means all [facility] affiliated entities including all health centers, surgery centers, clinics, and hospitals.  It includes more specifically, the following entities: [facility], a not for profit [facility], and its subsidiaries and affiliates, including [facility], [facility]., [facility], L[facility]., [facility], [facility], [facility], L.L[facility]C., [facility], L[facility]., [facility].d/b/a Ronnie  [facility], [facility], L.L.C.d/b/a [facility] As manager of [facility], [facility], [facility], [facility], L.L.C.d/b/a [facility], [facility], [facility], [facility], L[facility]C., and [facility] as manager of [facility].                                                                Patient/Legal Guardian Signature                                                    This signature was collected at 03/10/2021      /                                                                            Printed Name/Relationship to Patient                                                [facility] complies with applicable Federal civil rights laws and does not discriminate on the basis of race, color, national origin, age, disability, or sex.          ATENCIÓN: si habla español, tiene a maguire disposición servicios gratuitos de asistencia lingüística. Concepcion salgado [phone].         [facility] Ý: N?u b?n nói Ti?ng Vi?t, có các d?ch v? h? tr? ngôn ng? mi?n phí dành cho b?n.             REGISTRATION  AUTHORIZATION

## 2023-02-15 ENCOUNTER — OFFICE VISIT (OUTPATIENT)
Dept: PRIMARY CARE CLINIC | Facility: CLINIC | Age: 33
End: 2023-02-15
Payer: OTHER GOVERNMENT

## 2023-02-15 VITALS
HEIGHT: 69 IN | HEART RATE: 62 BPM | DIASTOLIC BLOOD PRESSURE: 70 MMHG | OXYGEN SATURATION: 99 % | BODY MASS INDEX: 26.51 KG/M2 | WEIGHT: 179 LBS | TEMPERATURE: 98 F | SYSTOLIC BLOOD PRESSURE: 104 MMHG

## 2023-02-15 DIAGNOSIS — R60.9 FLUID RETENTION: ICD-10-CM

## 2023-02-15 DIAGNOSIS — M77.11 LATERAL EPICONDYLITIS OF RIGHT ELBOW: ICD-10-CM

## 2023-02-15 DIAGNOSIS — I10 PRIMARY HYPERTENSION: Primary | ICD-10-CM

## 2023-02-15 DIAGNOSIS — Z13.220 SCREENING FOR LIPID DISORDERS: ICD-10-CM

## 2023-02-15 DIAGNOSIS — F43.29 ADJUSTMENT DISORDER WITH MIXED EMOTIONAL FEATURES: ICD-10-CM
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
## 2023-02-15 PROBLEM — N93.8 DYSFUNCTIONAL UTERINE BLEEDING: Status: ACTIVE | Noted: 2023-02-15

## 2023-02-15 PROBLEM — L30.9 ECZEMA: Status: ACTIVE | Noted: 2023-02-15

## 2023-02-15 PROBLEM — Z72.0 TOBACCO USER: Status: ACTIVE | Noted: 2023-02-15

## 2023-02-15 PROCEDURE — 99204 PR OFFICE/OUTPT VISIT, NEW, LEVL IV, 45-59 MIN: ICD-10-PCS | Mod: 25,S$GLB,, | Performed by: FAMILY MEDICINE

## 2023-02-15 PROCEDURE — 80053 COMPREHEN METABOLIC PANEL: CPT | Performed by: FAMILY MEDICINE

## 2023-02-15 PROCEDURE — 96372 THER/PROPH/DIAG INJ SC/IM: CPT | Mod: S$GLB,,, | Performed by: FAMILY MEDICINE

## 2023-02-15 PROCEDURE — 99204 OFFICE O/P NEW MOD 45 MIN: CPT | Mod: 25,S$GLB,, | Performed by: FAMILY MEDICINE

## 2023-02-15 PROCEDURE — 96372 PR INJECTION,THERAP/PROPH/DIAG2ST, IM OR SUBCUT: ICD-10-PCS | Mod: S$GLB,,, | Performed by: FAMILY MEDICINE

## 2023-02-15 PROCEDURE — 85025 COMPLETE CBC W/AUTO DIFF WBC: CPT | Performed by: FAMILY MEDICINE

## 2023-02-15 PROCEDURE — 80061 LIPID PANEL: CPT | Performed by: FAMILY MEDICINE

## 2023-02-15 RX ORDER — DICLOFENAC SODIUM 10 MG/G
2 GEL TOPICAL 4 TIMES DAILY
Qty: 100 G | Refills: 1 | Status: SHIPPED | OUTPATIENT
Start: 2023-02-15

## 2023-02-15 RX ORDER — TRIAMTERENE AND HYDROCHLOROTHIAZIDE 37.5; 25 MG/1; MG/1
1 CAPSULE ORAL DAILY
Qty: 90 CAPSULE | Refills: 1 | Status: SHIPPED | OUTPATIENT
Start: 2023-02-15 | End: 2023-07-03 | Stop reason: SDUPTHER

## 2023-02-15 RX ORDER — KETOROLAC TROMETHAMINE 30 MG/ML
60 INJECTION, SOLUTION INTRAMUSCULAR; INTRAVENOUS ONCE
Status: COMPLETED | OUTPATIENT
Start: 2023-02-15 | End: 2023-02-15

## 2023-02-15 RX ADMIN — KETOROLAC TROMETHAMINE 60 MG: 30 INJECTION, SOLUTION INTRAMUSCULAR; INTRAVENOUS at 02:02

## 2023-02-15 NOTE — PROGRESS NOTES
Subjective:       Patient ID: Lisa Buenrostro is a 32 y.o. female.    Chief Complaint: Establish Care (Patient presents due to establishing care and extreme fatigue and possible tendonitis right arm. And the c/o fluid retention. Need refills on blood pressure medication.)    Pt here to establish care     HTN with fluid retention: started after birth of child was placed on fluid pill which helps.    Adjustment disorder with depressed mood: in therapy, which helps. Not interested in medications.     Chronic fatigue: craved ice during pregnancy, was told she was not anemic. No change in level of fatigue. Has 4 year old and a 2 year old at home.     3 month h/o right forearm pain worse with certain movements like typing and picking up her children. Using tennis elbow brace and motrin prn, but symptoms have not resolved, but not worsened.     Review of Systems   Constitutional:  Positive for fatigue. Negative for appetite change, chills, fever and unexpected weight change.   HENT:  Negative for ear discharge, ear pain, hearing loss, mouth dryness, mouth sores, postnasal drip, rhinorrhea, sinus pressure/congestion, sneezing, sore throat and trouble swallowing.    Eyes:  Negative for photophobia, pain, discharge, redness and itching.   Respiratory:  Negative for cough, chest tightness, shortness of breath and wheezing.    Cardiovascular:  Negative for chest pain, palpitations and leg swelling.   Gastrointestinal:  Negative for abdominal pain, blood in stool, constipation, diarrhea, nausea and vomiting.   Endocrine: Negative for cold intolerance, heat intolerance, polydipsia, polyphagia and polyuria.   Genitourinary:  Negative for bladder incontinence, difficulty urinating, dysuria, frequency, hematuria, nocturia and urgency.   Musculoskeletal:  Positive for arthralgias and myalgias. Negative for gait problem and joint swelling.   Integumentary:  Negative for rash and wound.   Neurological:  Negative for dizziness, vertigo,  syncope, weakness, numbness and headaches.   Psychiatric/Behavioral:  Positive for dysphoric mood. Negative for agitation, behavioral problems, confusion, self-injury, sleep disturbance and suicidal ideas. The patient is not nervous/anxious and is not hyperactive.        Objective:      Physical Exam  Vitals reviewed.   Constitutional:       General: She is not in acute distress.     Appearance: Normal appearance. She is normal weight. She is not ill-appearing or toxic-appearing.   Cardiovascular:      Rate and Rhythm: Regular rhythm.      Pulses: Normal pulses.      Heart sounds: Normal heart sounds.   Pulmonary:      Effort: Pulmonary effort is normal.      Breath sounds: Normal breath sounds.   Abdominal:      General: Abdomen is flat. Bowel sounds are normal.      Palpations: Abdomen is soft.      Tenderness: There is no abdominal tenderness.   Musculoskeletal:         General: Tenderness present. No swelling.        Arms:       Right lower leg: No edema.      Left lower leg: No edema.      Comments: Area of tenderness   Neurological:      General: No focal deficit present.      Mental Status: She is alert and oriented to person, place, and time.      Motor: No weakness.   Psychiatric:         Mood and Affect: Mood normal.         Behavior: Behavior normal.       Assessment:       Problem List Items Addressed This Visit          Psychiatric    Adjustment disorder with mixed emotional features     Other Visit Diagnoses       Primary hypertension    -  Primary    Relevant Orders    Comprehensive Metabolic Panel    CBC Auto Differential    Fluid retention        Relevant Medications    triamterene-hydrochlorothiazide 37.5-25 mg (DYAZIDE) 37.5-25 mg per capsule    Lateral epicondylitis of right elbow        Injury caused by bending, initial encounter        Screening for lipid disorders        Relevant Orders    Lipid Panel    BMI 26.0-26.9,adult        Relevant Orders    Lipid Panel              Plan:       Labs  today  Trial of toradol in office, advised on ice, voltaren gel, avoiding heavy lifting and continue arm brace.   If no improvement consider PT  Risks, benefits, and side effects were discussed with the patient. All questions were answered to the fullest satisfaction of the patient, and pt verbalized understanding and agreement to treatment plan. Pt was to call with any new or worsening symptoms, or present to the ER.  RTC 2 wk continue to establish care         Radha Lewis MD  Family Medicine Physician   [facility]

## 2023-02-16 LAB
ALBUMIN SERPL BCP-MCNC: 3.4 G/DL (ref 3.5–5.2)
ALP SERPL-CCNC: 56 U/L (ref 55–135)
ALT SERPL W/O P-5'-P-CCNC: 21 U/L (ref 10–44)
ANION GAP SERPL CALC-SCNC: 5 MMOL/L (ref 8–16)
AST SERPL-CCNC: 22 U/L (ref 10–40)
BASOPHILS # BLD AUTO: 0.02 K/UL (ref 0–0.2)
BASOPHILS NFR BLD: 0.3 % (ref 0–1.9)
BILIRUB SERPL-MCNC: 0.4 MG/DL (ref 0.1–1)
BUN SERPL-MCNC: 13 MG/DL (ref 6–20)
CALCIUM SERPL-MCNC: 8.4 MG/DL (ref 8.7–10.5)
CHLORIDE SERPL-SCNC: 107 MMOL/L (ref 95–110)
CHOLEST SERPL-MCNC: 115 MG/DL (ref 120–199)
CHOLEST/HDLC SERPL: 1.5 {RATIO} (ref 2–5)
CO2 SERPL-SCNC: 24 MMOL/L (ref 23–29)
CREAT SERPL-MCNC: 0.8 MG/DL (ref 0.5–1.4)
DIFFERENTIAL METHOD: ABNORMAL
EOSINOPHIL # BLD AUTO: 0 K/UL (ref 0–0.5)
EOSINOPHIL NFR BLD: 0.5 % (ref 0–8)
ERYTHROCYTE [DISTWIDTH] IN BLOOD BY AUTOMATED COUNT: 13.3 % (ref 11.5–14.5)
EST. GFR  (NO RACE VARIABLE): >60 ML/MIN/1.73 M^2
GLUCOSE SERPL-MCNC: 85 MG/DL (ref 70–110)
HCT VFR BLD AUTO: 35.1 % (ref 37–48.5)
HDLC SERPL-MCNC: 77 MG/DL (ref 40–75)
HDLC SERPL: 67 % (ref 20–50)
HGB BLD-MCNC: 11.2 G/DL (ref 12–16)
IMM GRANULOCYTES # BLD AUTO: 0.02 K/UL (ref 0–0.04)
IMM GRANULOCYTES NFR BLD AUTO: 0.3 % (ref 0–0.5)
LDLC SERPL CALC-MCNC: 32.2 MG/DL (ref 63–159)
LYMPHOCYTES # BLD AUTO: 1.1 K/UL (ref 1–4.8)
LYMPHOCYTES NFR BLD: 17.9 % (ref 18–48)
MCH RBC QN AUTO: 27.7 PG (ref 27–31)
MCHC RBC AUTO-ENTMCNC: 31.9 G/DL (ref 32–36)
MCV RBC AUTO: 87 FL (ref 82–98)
MONOCYTES # BLD AUTO: 0.6 K/UL (ref 0.3–1)
MONOCYTES NFR BLD: 9.4 % (ref 4–15)
NEUTROPHILS # BLD AUTO: 4.4 K/UL (ref 1.8–7.7)
NEUTROPHILS NFR BLD: 71.6 % (ref 38–73)
NONHDLC SERPL-MCNC: 38 MG/DL
NRBC BLD-RTO: 0 /100 WBC
PLATELET # BLD AUTO: 145 K/UL (ref 150–450)
PMV BLD AUTO: 13.9 FL (ref 9.2–12.9)
POTASSIUM SERPL-SCNC: 4.6 MMOL/L (ref 3.5–5.1)
PROT SERPL-MCNC: 6.2 G/DL (ref 6–8.4)
RBC # BLD AUTO: 4.05 M/UL (ref 4–5.4)
SODIUM SERPL-SCNC: 136 MMOL/L (ref 136–145)
TRIGL SERPL-MCNC: 29 MG/DL (ref 30–150)
WBC # BLD AUTO: 6.09 K/UL (ref 3.9–12.7)

## 2023-03-01 ENCOUNTER — OFFICE VISIT (OUTPATIENT)
Dept: PRIMARY CARE CLINIC | Facility: CLINIC | Age: 33
End: 2023-03-01
Payer: OTHER GOVERNMENT

## 2023-03-01 VITALS
TEMPERATURE: 98 F | BODY MASS INDEX: 25.92 KG/M2 | SYSTOLIC BLOOD PRESSURE: 124 MMHG | HEART RATE: 60 BPM | OXYGEN SATURATION: 97 % | DIASTOLIC BLOOD PRESSURE: 68 MMHG | WEIGHT: 175 LBS | HEIGHT: 69 IN

## 2023-03-01 DIAGNOSIS — M25.40 SWELLING OF JOINT: ICD-10-CM

## 2023-03-01 DIAGNOSIS — R53.82 CHRONIC FATIGUE: Primary | ICD-10-CM

## 2023-03-01 PROCEDURE — 99213 OFFICE O/P EST LOW 20 MIN: CPT | Mod: S$GLB,,, | Performed by: FAMILY MEDICINE

## 2023-03-01 PROCEDURE — 99213 PR OFFICE/OUTPT VISIT, EST, LEVL III, 20-29 MIN: ICD-10-PCS | Mod: S$GLB,,, | Performed by: FAMILY MEDICINE

## 2023-03-02 ENCOUNTER — PATIENT MESSAGE (OUTPATIENT)
Dept: PRIMARY CARE CLINIC | Facility: CLINIC | Age: 33
End: 2023-03-02
Payer: OTHER GOVERNMENT

## 2023-03-03 ENCOUNTER — PATIENT MESSAGE (OUTPATIENT)
Dept: PRIMARY CARE CLINIC | Facility: CLINIC | Age: 33
End: 2023-03-03
Payer: OTHER GOVERNMENT

## 2023-03-03 ENCOUNTER — PATIENT OUTREACH (OUTPATIENT)
Dept: ADMINISTRATIVE | Facility: HOSPITAL | Age: 33
End: 2023-03-03
Payer: OTHER GOVERNMENT

## 2023-03-03 DIAGNOSIS — D50.0 IRON DEFICIENCY ANEMIA DUE TO CHRONIC BLOOD LOSS: Primary | ICD-10-CM

## 2023-03-03 NOTE — PROGRESS NOTES
Subjective:       Patient ID: Lisa Buenrostro is a 32 y.o. female.    Chief Complaint: Follow-up and Results    F/u :         Adjustment disorder with depressed mood: in therapy, which helps. Not interested in medications.     Chronic fatigue: craved ice during pregnancy, was told she was not anemic. No change in level of fatigue. Has 4 year old and a 2 year old at home.     3 month h/o right forearm pain worse with certain movements like typing and picking up her children. Using tennis elbow brace and motrin prn, but symptoms have not resolved, but not worsened.     3/1/23:    Pt states her symptoms of fatigue aren't really better, no matter what she does she's tried all the time still. Joint hurt and swell from time to time. States use to be able to multi task well, but now she can't stay organized to get things done, which is not like her. No prior h/o ADHD. States she's still doing couples therapy, but plans to start individual therapy as well. Labs from last visit show mild anemia.     Tendonitis elbow: got better with rest and modification of activities, but is still there. States will continue to modify activity.     Review of Systems   Constitutional:  Positive for fatigue. Negative for appetite change, chills, fever and unexpected weight change.   HENT:  Negative for ear discharge, ear pain, hearing loss, mouth dryness, mouth sores, postnasal drip, rhinorrhea, sinus pressure/congestion, sneezing, sore throat and trouble swallowing.    Eyes:  Negative for photophobia, pain, discharge, redness and itching.   Respiratory:  Negative for cough, chest tightness, shortness of breath and wheezing.    Cardiovascular:  Negative for chest pain, palpitations and leg swelling.   Gastrointestinal:  Negative for abdominal pain, blood in stool, constipation, diarrhea, nausea and vomiting.   Endocrine: Negative for cold intolerance, heat intolerance, polydipsia, polyphagia and polyuria.   Genitourinary:  Negative for bladder  incontinence, difficulty urinating, dysuria, frequency, hematuria, nocturia and urgency.   Musculoskeletal:  Positive for arthralgias and myalgias. Negative for gait problem and joint swelling.   Integumentary:  Negative for rash and wound.   Neurological:  Negative for dizziness, vertigo, syncope, weakness, numbness and headaches.   Psychiatric/Behavioral:  Positive for dysphoric mood. Negative for agitation, behavioral problems, confusion, self-injury, sleep disturbance and suicidal ideas. The patient is not nervous/anxious and is not hyperactive.        Objective:      Physical Exam  Vitals reviewed.   Constitutional:       General: She is not in acute distress.     Appearance: Normal appearance. She is normal weight. She is not ill-appearing or toxic-appearing.   Cardiovascular:      Rate and Rhythm: Regular rhythm.      Pulses: Normal pulses.      Heart sounds: Normal heart sounds.   Pulmonary:      Effort: Pulmonary effort is normal.      Breath sounds: Normal breath sounds.   Abdominal:      General: Abdomen is flat. Bowel sounds are normal.      Palpations: Abdomen is soft.      Tenderness: There is no abdominal tenderness.   Musculoskeletal:         General: Tenderness (no change) present. No swelling.        Arms:       Right lower leg: No edema.      Left lower leg: No edema.      Comments: Area of tenderness   Neurological:      General: No focal deficit present.      Mental Status: She is alert and oriented to person, place, and time.      Motor: No weakness.   Psychiatric:         Mood and Affect: Mood normal.         Behavior: Behavior normal.       Assessment:       Problem List Items Addressed This Visit    None  Visit Diagnoses       Chronic fatigue    -  Primary    Relevant Orders    TSH    C-Reactive Protein    Sedimentation rate    CAROLYNE Screen w/Reflex    Swelling of joint        Relevant Orders    TSH    C-Reactive Protein    Sedimentation rate    CAROLYNE Screen w/Reflex              Plan:        Will check additional labs  Strongly advised to follow with therapist to see if mood is starting to have a physical impact on her  Continue to modify activities for wrist  Risks, benefits, and side effects were discussed with the patient. All questions were answered to the fullest satisfaction of the patient, and pt verbalized understanding and agreement to treatment plan. Pt was to call with any new or worsening symptoms, or present to the ER.  RTC prn         Radha Lewis MD  Family Medicine Physician   [facility]

## 2023-03-03 NOTE — LETTER
FAX      AUTHORIZATION FOR RELEASE OF   CONFIDENTIAL INFORMATION        Dr. Judge      We are seeing Lisa Buenrostro, date of birth 1990, in the clinic at [facility]. Radha Lewis MD is the patient's PCP. Lisa Buenrostro has an outstanding lab/procedure at the time we reviewed their chart. In order to help keep their health information updated, Lisa Buenrostro has authorized us to request the following medical record(s):             (X)  PAP SMEAR (WITHIN 3 YRS OR HPV WITHIN 5 YRS)                  Please fax records to [facility]  [phone]        MARLA WHITESIDE LPN  CLINICAL CARE COORDINATOR   [facility]  PHONE: [phone]  FAX: [phone]

## 2023-03-06 ENCOUNTER — PATIENT OUTREACH (OUTPATIENT)
Dept: ADMINISTRATIVE | Facility: HOSPITAL | Age: 33
End: 2023-03-06
Payer: OTHER GOVERNMENT

## 2023-03-06 NOTE — PROGRESS NOTES
Population Health Outreach.     The following record(s)  below were uploaded for Health Maintenance .    MAMMOGRAM SCREENING            (X) PAP SMEAR  HPV SCREENING   CHLAMYDIA SCREENING    MAMMOGRAM SCREENING    DEXA SCREENING           HEMOGLOBIN A1c  LIPID PANEL  URINE MICROALBUMIN  COMPLETE METABOLIC PANEL  DM FOOT EXAM  DM EYE EXAM    COLONOSCOPY       FIT  KIT  COLOGUARD    (X) HEP C SCREENING  HIV SCREENING    ABDOMINAL AORTA ANEURYSM SCREENING (CT OF ABDOMEN)  LOW DOSE CT SCREENING (CXR)     IMMUNIZATIONS

## 2023-03-07 ENCOUNTER — PATIENT MESSAGE (OUTPATIENT)
Dept: PRIMARY CARE CLINIC | Facility: CLINIC | Age: 33
End: 2023-03-07
Payer: OTHER GOVERNMENT

## 2023-03-10 ENCOUNTER — OFFICE VISIT (OUTPATIENT)
Dept: PRIMARY CARE CLINIC | Facility: CLINIC | Age: 33
End: 2023-03-10
Payer: OTHER GOVERNMENT

## 2023-03-10 VITALS
HEART RATE: 77 BPM | TEMPERATURE: 98 F | BODY MASS INDEX: 25.82 KG/M2 | HEIGHT: 69 IN | OXYGEN SATURATION: 99 % | DIASTOLIC BLOOD PRESSURE: 64 MMHG | WEIGHT: 174.31 LBS | SYSTOLIC BLOOD PRESSURE: 122 MMHG
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
## 2023-03-10 DIAGNOSIS — F43.29 ADJUSTMENT DISORDER WITH MIXED EMOTIONAL FEATURES: ICD-10-CM

## 2023-03-10 DIAGNOSIS — M25.562 ACUTE PAIN OF LEFT KNEE: Primary | ICD-10-CM

## 2023-03-10 PROCEDURE — 99213 OFFICE O/P EST LOW 20 MIN: CPT | Mod: S$GLB,,, | Performed by: FAMILY MEDICINE

## 2023-03-10 PROCEDURE — 99213 PR OFFICE/OUTPT VISIT, EST, LEVL III, 20-29 MIN: ICD-10-PCS | Mod: S$GLB,,, | Performed by: FAMILY MEDICINE

## 2023-03-10 RX ORDER — ESCITALOPRAM OXALATE 5 MG/1
5 TABLET ORAL DAILY
Qty: 30 TABLET | Refills: 11 | Status: SHIPPED | OUTPATIENT
Start: 2023-03-10 | End: 2023-03-24
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
## 2023-03-24 ENCOUNTER — OFFICE VISIT (OUTPATIENT)
Dept: PRIMARY CARE CLINIC | Facility: CLINIC | Age: 33
End: 2023-03-24
Payer: OTHER GOVERNMENT

## 2023-03-24 DIAGNOSIS — F43.29 ADJUSTMENT DISORDER WITH MIXED EMOTIONAL FEATURES: Primary | ICD-10-CM

## 2023-03-24 PROCEDURE — 99212 PR OFFICE/OUTPT VISIT, EST, LEVL II, 10-19 MIN: ICD-10-PCS | Mod: 95,,, | Performed by: FAMILY MEDICINE

## 2023-03-24 PROCEDURE — 99212 OFFICE O/P EST SF 10 MIN: CPT | Mod: 95,,, | Performed by: FAMILY MEDICINE

## 2023-03-24 RX ORDER — BUPROPION HYDROCHLORIDE 75 MG/1
75 TABLET ORAL DAILY
Qty: 30 TABLET | Refills: 0 | Status: SHIPPED | OUTPATIENT
Start: 2023-03-24 | End: 2023-04-05

## 2023-03-24 NOTE — PROGRESS NOTES
The patient location is: [place]   The chief complaint leading to consultation is:  Adjustment disorder with anxiety and depression    Visit type: audiovisual    Face to Face time with patient: 8  12 minutes of total time spent on the encounter, which includes face to face time and non-face to face time preparing to see the patient (eg, review of tests), Obtaining and/or reviewing separately obtained history, Documenting clinical information in the electronic or other health record, Independently interpreting results (not separately reported) and communicating results to the patient/family/caregiver, or Care coordination (not separately reported).         Each patient to whom he or she provides medical services by telemedicine is:  (1) informed of the relationship between the physician and patient and the respective role of any other health care provider with respect to management of the patient; and (2) notified that he or she may decline to receive medical services by telemedicine and may withdraw from such care at any time.    Notes:      Follow-up adjustment disorder with anxiety and depression:  Patient states Lexapro was helping to make her last irritable, but it caused sedation and sexual dysfunction to the point where it is starting to interfere with her relationship with her , so she discontinued the medication.  Patient to try medication, as she could tell a difference on Lexapro.  States she still continues to do marriage counseling.  Will consider individual counseling again in future.    Review of Systems   Constitutional:  Negative for chills, fever and malaise/fatigue.   Respiratory:  Negative for cough, shortness of breath and wheezing.    Cardiovascular:  Negative for chest pain, palpitations and leg swelling.   Gastrointestinal:  Negative for abdominal pain, constipation, diarrhea, nausea and vomiting.   Neurological:  Negative for dizziness and headaches.   Psychiatric/Behavioral:   Positive for depression. Negative for suicidal ideas. The patient is nervous/anxious. The patient does not have insomnia.       Physical Exam  Constitutional:       General: She is not in acute distress.     Appearance: She is not ill-appearing or toxic-appearing.   Cardiovascular:      Pulses: Normal pulses.   Pulmonary:      Effort: No respiratory distress.   Musculoskeletal:         General: Normal range of motion.   Neurological:      General: No focal deficit present.      Mental Status: She is alert. Mental status is at baseline.   Psychiatric:         Mood and Affect: Mood normal.         Behavior: Behavior normal.      Assessment and plan:  Adjustment disorder with anxiety and depression:  Discontinue Lexapro was start Wellbutrin side effects discussed  Risks, benefits, and side effects were discussed with the patient. All questions were answered to the fullest satisfaction of the patient, and pt verbalized understanding and agreement to treatment plan. Pt was to call with any new or worsening symptoms, or present to the ER.  RTC 2 wks video visit         Radha Lewis MD  Family Medicine Physician   [facility]

## 2023-04-05 ENCOUNTER — OFFICE VISIT (OUTPATIENT)
Dept: OBSTETRICS AND GYNECOLOGY | Facility: CLINIC | Age: 33
End: 2023-04-05
Payer: OTHER GOVERNMENT

## 2023-04-05 VITALS
WEIGHT: 171.38 LBS | SYSTOLIC BLOOD PRESSURE: 119 MMHG | BODY MASS INDEX: 25.38 KG/M2 | HEIGHT: 69 IN | DIASTOLIC BLOOD PRESSURE: 74 MMHG | HEART RATE: 73 BPM

## 2023-04-05 DIAGNOSIS — Z01.419 WELL WOMAN EXAM WITH ROUTINE GYNECOLOGICAL EXAM: Primary | ICD-10-CM

## 2023-04-05 PROCEDURE — 99395 PREV VISIT EST AGE 18-39: CPT | Mod: S$GLB,,, | Performed by: NURSE PRACTITIONER

## 2023-04-05 PROCEDURE — 88175 CYTOPATH C/V AUTO FLUID REDO: CPT | Performed by: NURSE PRACTITIONER

## 2023-04-05 PROCEDURE — 99395 PR PREVENTIVE VISIT,EST,18-39: ICD-10-PCS | Mod: S$GLB,,, | Performed by: NURSE PRACTITIONER

## 2023-04-05 RX ORDER — BUPROPION HYDROCHLORIDE 150 MG/1
150 TABLET, EXTENDED RELEASE ORAL 2 TIMES DAILY
Qty: 60 TABLET | Refills: 11 | Status: SHIPPED | OUTPATIENT
Start: 2023-04-05 | End: 2023-07-03 | Stop reason: SDUPTHER

## 2023-04-05 NOTE — PROGRESS NOTES
"CC: Well woman exam    Lisa Buenrostro is a 33 y.o. female  presents for well woman exam.  LMP: Patient's last menstrual period was 03/15/2023 (approximate)..   Patients last pap was 2018.  Last wellness labs were done 2018.   Birth control Paragard.  SBE Yes. PCP-Dr. Lewis.  She recently started on Wellbutrin with Dr. Lewis.  She thinks it is helping but may need increase dose.    Chief Complaint   Patient presents with    Well Woman        Past Medical History:   Diagnosis Date    Hypertension      History reviewed. No pertinent surgical history.  Social History     Socioeconomic History    Marital status:    Tobacco Use    Smoking status: Former     Passive exposure: Never    Smokeless tobacco: Never   Substance and Sexual Activity    Alcohol use: Yes    Drug use: Never    Sexual activity: Yes     Partners: Male     Birth control/protection: I.U.D.     Comment: Paraguard 2021     Family History   Problem Relation Age of Onset    Diabetes Paternal Grandmother      OB History          3    Para   2    Term   2            AB   1    Living   2         SAB   1    IAB        Ectopic        Multiple        Live Births   2                 /74   Pulse 73   Ht 5' 9" (1.753 m)   Wt 77.7 kg (171 lb 6.4 oz)   LMP 03/15/2023 (Approximate)   Breastfeeding No   BMI 25.31 kg/m²       ROS:  GENERAL: Denies weight gain or weight loss. Feeling well overall.   SKIN: Denies rash or lesions.   HEAD: Denies head injury or headache.   NODES: Denies enlarged lymph nodes.   CHEST: Denies chest pain or shortness of breath.   CARDIOVASCULAR: Denies palpitations or left sided chest pain.   ABDOMEN: No abdominal pain, constipation, diarrhea, nausea, vomiting or rectal bleeding.   URINARY: No frequency, dysuria, hematuria, or burning on urination.  REPRODUCTIVE: See HPI.   BREASTS: The patient performs breast self-examination and denies pain, lumps, or nipple discharge.   HEMATOLOGIC: No easy " bruisability or excessive bleeding.   MUSCULOSKELETAL: Denies joint pain or swelling.   NEUROLOGIC: Denies syncope or weakness.   PSYCHIATRIC: Denies depression, anxiety or mood swings.    PHYSICAL EXAM:  APPEARANCE: Well nourished, well developed, in no acute distress.  AFFECT: WNL, alert and oriented x 3  SKIN: No acne or hirsutism  NECK: Neck symmetric without masses or thyromegaly  NODES: No inguinal, cervical, or axillary lymph node enlargement  CHEST: Good respiratory effect  ABDOMEN: Soft.  No tenderness or masses.  No hepatosplenomegaly.  No hernias.  BREASTS: Symmetrical, no skin changes or visible lesions.  No palpable masses, nipple discharge bilaterally.  PELVIC: Normal external genitalia without lesions.  Normal urethral meatus.  Vagina well rugated without lesions or discharge.  Cervix pink, without lesions, discharge or tenderness.  Strings visible through os.  No significant cystocele or rectocele.  Bimanual exam shows uterus to be normal size, regular, mobile and nontender.  Adnexa without masses or tenderness.    EXTREMITIES: No edema.    Well woman exam with routine gynecological exam  -     Liquid-Based Pap Smear, Screening    Other orders  -     buPROPion (WELLBUTRIN SR) 150 MG TBSR 12 hr tablet; Take 1 tablet (150 mg total) by mouth 2 (two) times daily.  Dispense: 60 tablet; Refill: 11      She will try increased dose of Wellbutrin 150 mg p.o.  She may also increase to twice a day if needed.        Patient was counseled today on A.C.S. Pap guidelines and recommendations for yearly pelvic exams, mammograms and monthly self breast exams; to see her PCP for other health maintenance.     Follow up in about 1 year (around 4/5/2024).

## 2023-04-11 ENCOUNTER — PATIENT MESSAGE (OUTPATIENT)
Dept: ADMINISTRATIVE | Facility: HOSPITAL | Age: 33
End: 2023-04-11
Payer: OTHER GOVERNMENT

## 2023-04-14 LAB
FINAL PATHOLOGIC DIAGNOSIS: NORMAL
Lab: NORMAL

## 2023-07-03 ENCOUNTER — PATIENT MESSAGE (OUTPATIENT)
Dept: OBSTETRICS AND GYNECOLOGY | Facility: CLINIC | Age: 33
End: 2023-07-03
Payer: OTHER GOVERNMENT

## 2023-07-03 DIAGNOSIS — R60.9 FLUID RETENTION: ICD-10-CM

## 2023-07-03 RX ORDER — TRIAMTERENE AND HYDROCHLOROTHIAZIDE 37.5; 25 MG/1; MG/1
1 CAPSULE ORAL DAILY
Qty: 90 CAPSULE | Refills: 1 | Status: SHIPPED | OUTPATIENT
Start: 2023-07-03 | End: 2023-08-08 | Stop reason: SDUPTHER

## 2023-07-03 RX ORDER — BUPROPION HYDROCHLORIDE 150 MG/1
150 TABLET, EXTENDED RELEASE ORAL 2 TIMES DAILY
Qty: 60 TABLET | Refills: 11 | Status: SHIPPED | OUTPATIENT
Start: 2023-07-03 | End: 2024-07-02

## 2023-08-08 DIAGNOSIS — R60.9 FLUID RETENTION: ICD-10-CM

## 2023-08-09 ENCOUNTER — PATIENT MESSAGE (OUTPATIENT)
Dept: OBSTETRICS AND GYNECOLOGY | Facility: CLINIC | Age: 33
End: 2023-08-09

## 2023-08-09 ENCOUNTER — PATIENT MESSAGE (OUTPATIENT)
Dept: FAMILY MEDICINE | Facility: CLINIC | Age: 33
End: 2023-08-09
Payer: OTHER GOVERNMENT

## 2023-08-09 RX ORDER — TRIAMTERENE AND HYDROCHLOROTHIAZIDE 37.5; 25 MG/1; MG/1
1 CAPSULE ORAL DAILY
Qty: 90 CAPSULE | Refills: 1 | Status: SHIPPED | OUTPATIENT
Start: 2023-08-09 | End: 2024-08-08

## 2024-04-30 ENCOUNTER — TELEPHONE (OUTPATIENT)
Dept: FAMILY MEDICINE | Facility: CLINIC | Age: 34
End: 2024-04-30
Payer: OTHER GOVERNMENT

## 2024-04-30 NOTE — TELEPHONE ENCOUNTER
Portal message sent to schedule an in office visit with Dr. Lewis for a follow up and to obtain a blood pressure reading.

## 2024-05-21 ENCOUNTER — PATIENT MESSAGE (OUTPATIENT)
Dept: ADMINISTRATIVE | Facility: HOSPITAL | Age: 34
End: 2024-05-21
Payer: OTHER GOVERNMENT

## 2024-07-09 ENCOUNTER — PATIENT MESSAGE (OUTPATIENT)
Dept: ADMINISTRATIVE | Facility: HOSPITAL | Age: 34
End: 2024-07-09
Payer: OTHER GOVERNMENT

## 2024-09-06 DIAGNOSIS — I10 PRIMARY HYPERTENSION: ICD-10-CM

## 2024-09-19 ENCOUNTER — OFFICE VISIT (OUTPATIENT)
Dept: FAMILY MEDICINE | Facility: CLINIC | Age: 34
End: 2024-09-19
Payer: OTHER GOVERNMENT

## 2024-09-19 ENCOUNTER — LAB VISIT (OUTPATIENT)
Dept: FAMILY MEDICINE | Facility: CLINIC | Age: 34
End: 2024-09-19
Payer: OTHER GOVERNMENT

## 2024-09-19 ENCOUNTER — PATIENT MESSAGE (OUTPATIENT)
Dept: FAMILY MEDICINE | Facility: CLINIC | Age: 34
End: 2024-09-19

## 2024-09-19 VITALS
HEIGHT: 69 IN | BODY MASS INDEX: 27.9 KG/M2 | SYSTOLIC BLOOD PRESSURE: 138 MMHG | HEART RATE: 76 BPM | DIASTOLIC BLOOD PRESSURE: 70 MMHG | OXYGEN SATURATION: 98 % | WEIGHT: 188.38 LBS

## 2024-09-19 DIAGNOSIS — R53.82 CHRONIC FATIGUE: ICD-10-CM

## 2024-09-19 DIAGNOSIS — I10 PRIMARY HYPERTENSION: ICD-10-CM

## 2024-09-19 DIAGNOSIS — Z13.31 NEGATIVE DEPRESSION SCREENING: ICD-10-CM

## 2024-09-19 DIAGNOSIS — Z87.891 FORMER SMOKER: ICD-10-CM

## 2024-09-19 DIAGNOSIS — E28.2 PCOS (POLYCYSTIC OVARIAN SYNDROME): ICD-10-CM

## 2024-09-19 DIAGNOSIS — Z00.00 WELLNESS EXAMINATION: Primary | ICD-10-CM

## 2024-09-19 DIAGNOSIS — Z71.3 WEIGHT LOSS COUNSELING, ENCOUNTER FOR: ICD-10-CM

## 2024-09-19 DIAGNOSIS — F43.29 ADJUSTMENT DISORDER WITH MIXED EMOTIONAL FEATURES: ICD-10-CM

## 2024-09-19 PROBLEM — Z97.5 PRESENCE OF INTRAUTERINE CONTRACEPTIVE DEVICE: Status: ACTIVE | Noted: 2024-09-19

## 2024-09-19 LAB
ALBUMIN SERPL BCP-MCNC: 4.1 G/DL (ref 3.5–5.2)
ALP SERPL-CCNC: 75 U/L (ref 55–135)
ALT SERPL W/O P-5'-P-CCNC: 12 U/L (ref 10–44)
ANION GAP SERPL CALC-SCNC: 8 MMOL/L (ref 8–16)
AST SERPL-CCNC: 18 U/L (ref 10–40)
BASOPHILS # BLD AUTO: 0.04 K/UL (ref 0–0.2)
BASOPHILS NFR BLD: 0.7 % (ref 0–1.9)
BILIRUB SERPL-MCNC: 0.5 MG/DL (ref 0.1–1)
BUN SERPL-MCNC: 10 MG/DL (ref 6–20)
CALCIUM SERPL-MCNC: 9.8 MG/DL (ref 8.7–10.5)
CHLORIDE SERPL-SCNC: 100 MMOL/L (ref 95–110)
CHOLEST SERPL-MCNC: 146 MG/DL (ref 120–199)
CHOLEST/HDLC SERPL: 1.6 {RATIO} (ref 2–5)
CO2 SERPL-SCNC: 30 MMOL/L (ref 23–29)
CREAT SERPL-MCNC: 1 MG/DL (ref 0.5–1.4)
DIFFERENTIAL METHOD BLD: NORMAL
EOSINOPHIL # BLD AUTO: 0 K/UL (ref 0–0.5)
EOSINOPHIL NFR BLD: 0.7 % (ref 0–8)
ERYTHROCYTE [DISTWIDTH] IN BLOOD BY AUTOMATED COUNT: 12.2 % (ref 11.5–14.5)
EST. GFR  (NO RACE VARIABLE): >60 ML/MIN/1.73 M^2
GLUCOSE SERPL-MCNC: 98 MG/DL (ref 70–110)
HCT VFR BLD AUTO: 43.5 % (ref 37–48.5)
HDLC SERPL-MCNC: 90 MG/DL (ref 40–75)
HDLC SERPL: 61.6 % (ref 20–50)
HGB BLD-MCNC: 14.3 G/DL (ref 12–16)
IMM GRANULOCYTES # BLD AUTO: 0 K/UL (ref 0–0.04)
IMM GRANULOCYTES NFR BLD AUTO: 0 % (ref 0–0.5)
LDLC SERPL CALC-MCNC: 46.2 MG/DL (ref 63–159)
LYMPHOCYTES # BLD AUTO: 1.2 K/UL (ref 1–4.8)
LYMPHOCYTES NFR BLD: 22.7 % (ref 18–48)
MCH RBC QN AUTO: 28.4 PG (ref 27–31)
MCHC RBC AUTO-ENTMCNC: 32.9 G/DL (ref 32–36)
MCV RBC AUTO: 87 FL (ref 82–98)
MONOCYTES # BLD AUTO: 0.4 K/UL (ref 0.3–1)
MONOCYTES NFR BLD: 8.2 % (ref 4–15)
NEUTROPHILS # BLD AUTO: 3.6 K/UL (ref 1.8–7.7)
NEUTROPHILS NFR BLD: 67.7 % (ref 38–73)
NONHDLC SERPL-MCNC: 56 MG/DL
NRBC BLD-RTO: 0 /100 WBC
PLATELET # BLD AUTO: 232 K/UL (ref 150–450)
PMV BLD AUTO: 12.4 FL (ref 9.2–12.9)
POTASSIUM SERPL-SCNC: 4 MMOL/L (ref 3.5–5.1)
PROT SERPL-MCNC: 7.8 G/DL (ref 6–8.4)
RBC # BLD AUTO: 5.03 M/UL (ref 4–5.4)
SODIUM SERPL-SCNC: 138 MMOL/L (ref 136–145)
TRIGL SERPL-MCNC: 49 MG/DL (ref 30–150)
TSH SERPL DL<=0.005 MIU/L-ACNC: 1.27 UIU/ML (ref 0.4–4)
WBC # BLD AUTO: 5.34 K/UL (ref 3.9–12.7)

## 2024-09-19 PROCEDURE — 84443 ASSAY THYROID STIM HORMONE: CPT | Performed by: FAMILY MEDICINE

## 2024-09-19 PROCEDURE — 85025 COMPLETE CBC W/AUTO DIFF WBC: CPT | Performed by: FAMILY MEDICINE

## 2024-09-19 PROCEDURE — 80061 LIPID PANEL: CPT | Performed by: FAMILY MEDICINE

## 2024-09-19 PROCEDURE — 99999 PR PBB SHADOW E&M-EST. PATIENT-LVL III: CPT | Mod: PBBFAC,,, | Performed by: FAMILY MEDICINE

## 2024-09-19 PROCEDURE — 82306 VITAMIN D 25 HYDROXY: CPT | Performed by: FAMILY MEDICINE

## 2024-09-19 PROCEDURE — 80053 COMPREHEN METABOLIC PANEL: CPT | Performed by: FAMILY MEDICINE

## 2024-09-19 PROCEDURE — 83036 HEMOGLOBIN GLYCOSYLATED A1C: CPT | Performed by: FAMILY MEDICINE

## 2024-09-19 RX ORDER — TRIAMTERENE AND HYDROCHLOROTHIAZIDE 37.5; 25 MG/1; MG/1
1 CAPSULE ORAL DAILY
Qty: 90 CAPSULE | Refills: 3 | Status: SHIPPED | OUTPATIENT
Start: 2024-09-19 | End: 2025-09-19

## 2024-09-19 RX ORDER — SPIRONOLACTONE 50 MG/1
100 TABLET, FILM COATED ORAL 2 TIMES DAILY
COMMUNITY

## 2024-09-19 RX ORDER — IBUPROFEN 800 MG/1
800 TABLET ORAL 3 TIMES DAILY
COMMUNITY

## 2024-09-19 RX ORDER — METFORMIN HYDROCHLORIDE 500 MG/1
500 TABLET ORAL 2 TIMES DAILY WITH MEALS
COMMUNITY

## 2024-09-19 NOTE — PROGRESS NOTES
Subjective     Patient ID: Lisa Buenrostro is a 34 y.o. female.    Chief Complaint: Annual Exam (Pt is here for her annual check up)    Wellness exam    Adjustment disorder: states she taking Wellbutrin prn.   HTN: compliant with medication  PCOS: taking spironolactone and metformin prn. States is chronic tired.    Overweight: states she feels like she cannot lose weight. Has standing desk, bike in her office, 2 boys, doesn't feel depressed. States her family has his thing where they gain weight.       Review of Systems   Constitutional:  Positive for unexpected weight change. Negative for activity change.   HENT:  Negative for hearing loss, rhinorrhea and trouble swallowing.    Eyes:  Negative for discharge and visual disturbance.   Respiratory:  Negative for chest tightness and wheezing.    Cardiovascular:  Negative for chest pain and palpitations.   Gastrointestinal:  Negative for blood in stool, constipation, diarrhea and vomiting.   Endocrine: Negative for polydipsia and polyuria.   Genitourinary:  Negative for difficulty urinating, dysuria, hematuria and menstrual problem.   Musculoskeletal:  Negative for arthralgias, joint swelling and neck pain.   Neurological:  Negative for weakness and headaches.   Psychiatric/Behavioral:  Negative for confusion and dysphoric mood.           Objective     Physical Exam  Vitals reviewed.   Constitutional:       General: She is not in acute distress.     Appearance: Normal appearance. She is not ill-appearing or toxic-appearing.   Cardiovascular:      Rate and Rhythm: Normal rate and regular rhythm.      Heart sounds: Normal heart sounds.   Pulmonary:      Effort: Pulmonary effort is normal.      Breath sounds: Normal breath sounds.   Abdominal:      General: Abdomen is flat. Bowel sounds are normal.      Palpations: Abdomen is soft.      Tenderness: There is no abdominal tenderness.   Neurological:      General: No focal deficit present.      Mental Status: She is alert and  oriented to person, place, and time.   Psychiatric:         Mood and Affect: Mood normal.         Behavior: Behavior normal.            Assessment and Plan     1. Wellness examination    2. Adjustment disorder with mixed emotional features    3. PCOS (polycystic ovarian syndrome)  -     CBC Auto Differential; Future; Expected date: 09/19/2024  -     Comprehensive Metabolic Panel; Future; Expected date: 09/19/2024  -     Hemoglobin A1C; Future; Expected date: 09/19/2024  -     TSH; Future; Expected date: 09/19/2024  -     Vitamin D 25 hydroxy; Future; Expected date: 09/19/2024    4. Primary hypertension  -     Lipid Panel; Future; Expected date: 09/19/2024  -     triamterene-hydrochlorothiazide 37.5-25 mg (DYAZIDE) 37.5-25 mg per capsule; Take 1 capsule by mouth once daily.  Dispense: 90 capsule; Refill: 3    5. Chronic fatigue    6. BMI 27.0-27.9,adult    7. Former smoker      Had 20 minute discussion on various weight loss medications and programs.  Advised on emphasis between really tracking caloric intake versus expenditure to see if there is an overall net deficit as we often times think that we are doing enough to result in weight loss.  We will check labs.  Advised patient to research what is required for the prior authorization for Wegovy.  If patient has completed that will consider completing prior authorization.  Advised that even with Wegovy results are dependent upon the medication in many times patient's we will gained weight back once medication to stop.  Advised that weight will come from any area and not just a specific location in particular.  Refilled medications  Risks, benefits, and side effects were discussed with the patient. All questions were answered to the fullest satisfaction of the patient, and pt verbalized understanding and agreement to treatment plan. Pt was to call with any new or worsening symptoms, or present to the ER.  RTC 1 year or sooner if needed       Radha Lewis MD  Family  Medicine Physician   [facility]     This note was created using [employer] voice recognition software that occasionally may misinterpret phrases or words.

## 2024-09-20 LAB
25(OH)D3+25(OH)D2 SERPL-MCNC: 23 NG/ML (ref 30–96)
ESTIMATED AVG GLUCOSE: 100 MG/DL (ref 68–131)
HBA1C MFR BLD: 5.1 % (ref 4–5.6)

## 2024-09-20 RX ORDER — ERGOCALCIFEROL 1.25 MG/1
50000 CAPSULE ORAL
Qty: 4 CAPSULE | Refills: 6 | Status: SHIPPED | OUTPATIENT
Start: 2024-09-20

## 2024-09-27 RX ORDER — BUPROPION HYDROCHLORIDE 150 MG/1
150 TABLET, EXTENDED RELEASE ORAL 2 TIMES DAILY
Qty: 180 TABLET | Refills: 0 | Status: SHIPPED | OUTPATIENT
Start: 2024-09-27

## 2024-09-27 NOTE — TELEPHONE ENCOUNTER
No care due was identified.  [facility] Embedded Care Due Messages. Reference number: [id number].   9/27/2024 8:55:44 AM CDT

## 2024-09-27 NOTE — TELEPHONE ENCOUNTER
Refill Routing Note   Medication(s) are not appropriate for processing by [facility] for the following reason(s):        No active prescription written by provider    [facility] action(s):  Defer               Appointments  past 12m or future 3m with PCP    Date Provider   Last Visit   9/19/2024 Radha Lewis MD   Next Visit   Visit date not found Radha Lewis MD   ED visits in past 90 days: 0        Note composed:1:52 PM 09/27/2024

## 2024-12-26 RX ORDER — BUPROPION HYDROCHLORIDE 150 MG/1
150 TABLET, EXTENDED RELEASE ORAL 2 TIMES DAILY
Qty: 180 TABLET | Refills: 2 | Status: SHIPPED | OUTPATIENT
Start: 2024-12-26 | End: 2024-12-26 | Stop reason: CLARIF

## 2024-12-26 NOTE — TELEPHONE ENCOUNTER
Dictation #1  MRN:[id number]  CSN:[id number]  Refill Decision Note   Lisa Buenrostro  is requesting a refill authorization.  Brief Assessment and Rationale for Refill:  Defer     Medication Therapy Plan:         Comments:     Note composed:11:39 AM 12/26/2024

## 2024-12-26 NOTE — TELEPHONE ENCOUNTER
No care due was identified.  [facility] Embedded Care Due Messages. Reference number: [id number].   12/26/2024 12:33:38 AM CST

## 2024-12-27 RX ORDER — BUPROPION HYDROCHLORIDE 150 MG/1
150 TABLET, EXTENDED RELEASE ORAL 2 TIMES DAILY
Qty: 180 TABLET | Refills: 2 | OUTPATIENT
Start: 2024-12-27